# Patient Record
Sex: FEMALE | Race: ASIAN | URBAN - NONMETROPOLITAN AREA
[De-identification: names, ages, dates, MRNs, and addresses within clinical notes are randomized per-mention and may not be internally consistent; named-entity substitution may affect disease eponyms.]

---

## 2020-07-22 ENCOUNTER — APPOINTMENT (RX ONLY)
Dept: URBAN - NONMETROPOLITAN AREA CLINIC 1 | Facility: CLINIC | Age: 45
Setting detail: DERMATOLOGY
End: 2020-07-22

## 2020-07-22 DIAGNOSIS — Z41.9 ENCOUNTER FOR PROCEDURE FOR PURPOSES OTHER THAN REMEDYING HEALTH STATE, UNSPECIFIED: ICD-10-CM

## 2020-07-22 PROCEDURE — ? FILLERS

## 2020-07-22 PROCEDURE — ? BOTOX

## 2020-07-22 NOTE — PROCEDURE: FILLERS
Additional Area 5 Volume In Cc: 0
Detail Level: Detailed
Filler: Restylane
Include Cannula Information In Note?: No
Topical Anesthesia?: LMX
Additional Anesthesia Volume In Cc: 6
Anesthesia Type: 1% lidocaine with epinephrine
Map Statment: See Attach Map for Complete Details
Anesthesia Volume In Cc: 0.5
Post-Care Instructions: Patient instructed to apply ice to reduce swelling.
Consent: Written consent obtained. Risks include but not limited to bruising, beading, irregular texture, ulceration, infection, allergic reaction, scar formation, incomplete augmentation, temporary nature, procedural pain.

## 2020-07-22 NOTE — PROCEDURE: BOTOX
Post-Care Instructions: Patient instructed do not lie down or bend over 4 hours after treatment. Do not manipulate treatment area and limit physical activity for 24 hours. Patient instructed not to travel by airplane for 48 hours.\\n\\nResults with botox take 5 days to appear and improve up to 2 weeks post injection.  \\n\\nCall the clinic if any questions or concerns arise at [phone]. Post-Care Instructions: Patient instructed do not lie down or bend over 4 hours after treatment. Do not manipulate treatment area and limit physical activity for 24 hours. Patient instructed not to travel by airplane for 48 hours.\\n\\nResults with botox take 5 days to appear and improve up to 2 weeks post injection.  \\n\\nCall the clinic if any questions or concerns arise at [phone].

## 2020-08-03 ENCOUNTER — APPOINTMENT (RX ONLY)
Dept: URBAN - NONMETROPOLITAN AREA CLINIC 1 | Facility: CLINIC | Age: 45
Setting detail: DERMATOLOGY
End: 2020-08-03

## 2020-08-03 DIAGNOSIS — Z41.9 ENCOUNTER FOR PROCEDURE FOR PURPOSES OTHER THAN REMEDYING HEALTH STATE, UNSPECIFIED: ICD-10-CM

## 2020-08-03 PROCEDURE — ? BOTOX

## 2020-08-03 NOTE — PROCEDURE: BOTOX
Post-Care Instructions: Patient instructed do not lie down or bend over 4 hours after treatment. Do not manipulate treatment area and limit physical activity for 24 hours. Patient instructed not to travel by airplane for 48 hours.\\n\\nResults with botox take 5 days to appear and improve up to 2 weeks post injection.  \\n\\nCall the clinic if any questions or concerns arise at [phone]. Post-Care Instructions: Patient instructed do not lie down or bend over 4 hours after treatment. Do not manipulate treatment area and limit physical activity for 24 hours. Patient instructed not to travel by airplane for 48 hours.\\n\\nResults with botox take 5 days to appear and improve up to 2 weeks post injection.  \\n\\nCall the clinic if any questions or concerns arise at [phone].